# Patient Record
(demographics unavailable — no encounter records)

---

## 2025-01-01 NOTE — RESULTS/DATA
[FreeTextEntry1] : Accession:                             [id number]  Collected Date/Time:                   12/20/2024 15:31 EST Received Date/Time:                    12/21/2024 08:13 EST  Surgical Pathology Report - Auth (Verified)  Specimen(s) Submitted 1  Right breast 1:00 1 cmfn  Final Diagnosis Breast, right, 1:00 1 cmfn, core biopsy: - Benign dilated ducts and stromal fibrosis  Verified by: Tera Persaud M.D. (Electronic Signature) Reported on: 12/24/24 11:04 EST, [facility]-[address], [address] [place] [address] Phone: [phone]   Fax: [phone]

## 2025-01-01 NOTE — ASSESSMENT
[FreeTextEntry1] : 66F PMH breast cancer (2013) evaluated for breast cancer history + abnormal US findings.   #L Breast Cancer, Invasive Ductal, HER2+ (2013) - T2N1, Stage IIB - Pathology (2/7/2013): L Breast 2:00 infitrating ductal carcinoma, poorly differentiated, +lymphatic invasion; +L axillary LN positive for carcinoma - Treatment: Neoadjuvant TCH (Dr. Alfonso at [facility]) >> L mastectomy with ALND ([place]) >> herceptin (completed 2/2014) + anastrazole (10 years completed in 2/2024) - Mammogram 12/3/24 surveillance = BIRADS-4A, R inner breast with suspicious findings - CNB 12/20/24 R breast = negative for malignancy; +stromal fibrosis  Plan: - No need for any additional oncologic treatment given benign findings - Germline Genetic testing today ordered ([facility]) - will request MRI breast (Peer to Peer scheduled for later today) - C/w active surveillance, mammo/US in 6 months - BMD scan pending, ordered - PCP f/u for other routine healthcare maintenance  RTC in 6 months for follow up.  Discussed with Dr. Burhc. Rafael Ferraro MD Hematology & Oncology Fellow, PGY4

## 2025-01-01 NOTE — HISTORY OF PRESENT ILLNESS
[Disease: _____________________] : Disease: [unfilled] [T: ___] : T[unfilled] [N: ___] : N[unfilled] [AJCC Stage: ____] : AJCC Stage: [unfilled] [de-identified] : 66F PMH breast cancer () presents to establish medical oncology follow up after recent abnormal surveillance mammogram showed BIRADS-4A on contralateral breast, subsequent biopsy (24) was negative for malignancy. Pt denies any other complaints / issues at all today.   #L Breast Cancer, Invasive Ductal, HER2+ () - T2N1, Stage IIB - Pathology (2013): L Breast 2:00 infitrating ductal carcinoma, poorly differentiated, +lymphatic invasion; +L axillary LN positive for carcinoma - Treatment: Neoadjuvant TCH (Dr. Alfonso at [facility]) >> L mastectomy with ALND ([place]) >> herceptin (completed 2014) + anastrazole (10 years completed in 2024) - Mammogram 12/3/24 surveillance = BIRADS-4A, R inner breast with suspicious findings - CNB 24 R breast = negative for malignancy; +stromal fibrosis    PMH: breast cancer, HLD, hyperthyroid, HTN PSH: , L breast mastectomy () Meds: amlodipine, losartan, methimazole Allergies: NKDA FH: sister with breast cancer (age 68 on dx, still alive) SH: no EtOH/smoking; lives at home with , works as caregiver at a ; Diet healthy, homecooking; exercises at home cycling, walks often  Screenings: - Colonoscopy completed outside [facility], wnl per patient, <10 years ago - Pap Smear outside [facility], wnl per patient - Mammogram  - BMD 2022 last, osteopenia  GYN Hx Menstrual Hx: Menarche at age 13, Menopause around age 50 (~) Prior pregnancies:  Age at live birth: 1st birth at age 28 Fertility or Hormone Replacement Treatments: None Birth Control Pill Use: None Breast Feeding History: Breast fed all 3 kids [de-identified] : 2/6/2013 (US Core Needle L Breast 2:00, Axillary LN): 1 LN +carcinoma, L breast 1.4cm infiltrating ductal carcinoma, poorly differentiated, +lymphatic invasion [de-identified] : 1/24/2013 (US Core Needle): Invasive ductal carcinoma, poorly differentiated, L Breast 11-12 o'clock measuring 1.2cm ER 0%, NV 0%, HER-2 Positive Ki-67 60%  2/6/2013 (US Core Needle L Breast 2:00, Axillary LN): 1 LN +carcinoma, L breast 1.4cm infiltrating ductal carcionma, poorly differentiated, +lymphatic invasion ER-, NV+, Her2 Positive Ki-67 30%  7/02/2013 (L Mastectomy with axillary LN dissection): negative margins, 0/21 LNs positive for malignancy

## 2025-01-01 NOTE — RESULTS/DATA
[FreeTextEntry1] : Accession:                             [id number]  Collected Date/Time:                   12/20/2024 15:31 EST Received Date/Time:                    12/21/2024 08:13 EST  Surgical Pathology Report - Auth (Verified)  Specimen(s) Submitted 1  Right breast 1:00 1 cmfn  Final Diagnosis Breast, right, 1:00 1 cmfn, core biopsy: - Benign dilated ducts and stromal fibrosis  Verified by: Tera Persaud M.D. (Electronic Signature) Reported on: 12/24/24 11:04 EST, [facility]-[address], [address] [place] [address] Phone: [phone]   Fax: [phone]

## 2025-01-01 NOTE — HISTORY OF PRESENT ILLNESS
[Disease: _____________________] : Disease: [unfilled] [T: ___] : T[unfilled] [N: ___] : N[unfilled] [AJCC Stage: ____] : AJCC Stage: [unfilled] [de-identified] : 66F PMH breast cancer () presents to establish medical oncology follow up after recent abnormal surveillance mammogram showed BIRADS-4A on contralateral breast, subsequent biopsy (24) was negative for malignancy. Pt denies any other complaints / issues at all today.   #L Breast Cancer, Invasive Ductal, HER2+ () - T2N1, Stage IIB - Pathology (2013): L Breast 2:00 infitrating ductal carcinoma, poorly differentiated, +lymphatic invasion; +L axillary LN positive for carcinoma - Treatment: Neoadjuvant TCH (Dr. Alfonso at [facility]) >> L mastectomy with ALND ([place]) >> herceptin (completed 2014) + anastrazole (10 years completed in 2024) - Mammogram 12/3/24 surveillance = BIRADS-4A, R inner breast with suspicious findings - CNB 24 R breast = negative for malignancy; +stromal fibrosis    PMH: breast cancer, HLD, hyperthyroid, HTN PSH: , L breast mastectomy () Meds: amlodipine, losartan, methimazole Allergies: NKDA FH: sister with breast cancer (age 68 on dx, still alive) SH: no EtOH/smoking; lives at home with , works as caregiver at a ; Diet healthy, homecooking; exercises at home cycling, walks often  Screenings: - Colonoscopy completed outside [facility], wnl per patient, <10 years ago - Pap Smear outside [facility], wnl per patient - Mammogram  - BMD 2022 last, osteopenia  GYN Hx Menstrual Hx: Menarche at age 13, Menopause around age 50 (~) Prior pregnancies:  Age at live birth: 1st birth at age 28 Fertility or Hormone Replacement Treatments: None Birth Control Pill Use: None Breast Feeding History: Breast fed all 3 kids [de-identified] : 2/6/2013 (US Core Needle L Breast 2:00, Axillary LN): 1 LN +carcinoma, L breast 1.4cm infiltrating ductal carcinoma, poorly differentiated, +lymphatic invasion [de-identified] : 1/24/2013 (US Core Needle): Invasive ductal carcinoma, poorly differentiated, L Breast 11-12 o'clock measuring 1.2cm ER 0%, NV 0%, HER-2 Positive Ki-67 60%  2/6/2013 (US Core Needle L Breast 2:00, Axillary LN): 1 LN +carcinoma, L breast 1.4cm infiltrating ductal carcionma, poorly differentiated, +lymphatic invasion ER-, NV+, Her2 Positive Ki-67 30%  7/02/2013 (L Mastectomy with axillary LN dissection): negative margins, 0/21 LNs positive for malignancy

## 2025-01-01 NOTE — ASSESSMENT
[FreeTextEntry1] : 66F PMH breast cancer (2013) evaluated for breast cancer history + abnormal US findings.   #L Breast Cancer, Invasive Ductal, HER2+ (2013) - T2N1, Stage IIB - Pathology (2/7/2013): L Breast 2:00 infitrating ductal carcinoma, poorly differentiated, +lymphatic invasion; +L axillary LN positive for carcinoma - Treatment: Neoadjuvant TCH (Dr. Alfonso at [facility]) >> L mastectomy with ALND ([place]) >> herceptin (completed 2/2014) + anastrazole (10 years completed in 2/2024) - Mammogram 12/3/24 surveillance = BIRADS-4A, R inner breast with suspicious findings - CNB 12/20/24 R breast = negative for malignancy; +stromal fibrosis  Plan: - No need for any additional oncologic treatment given benign findings - Germline Genetic testing today ordered ([facility]) - will request MRI breast (Peer to Peer scheduled for later today) - C/w active surveillance, mammo/US in 6 months - BMD scan pending, ordered - PCP f/u for other routine healthcare maintenance  RTC in 6 months for follow up.  Discussed with Dr. Burch. Rafael Ferraro MD Hematology & Oncology Fellow, PGY4

## 2025-01-01 NOTE — REASON FOR VISIT
[Follow-Up Visit] : a follow-up [FreeTextEntry2] : Transferring care from Dr. Goldberg to Dr. Burch today

## 2025-06-19 NOTE — HISTORY OF PRESENT ILLNESS
[Disease: _____________________] : Disease: [unfilled] [T: ___] : T[unfilled] [N: ___] : N[unfilled] [AJCC Stage: ____] : AJCC Stage: [unfilled] [100: Normal, no complaints, no evidence of disease.] : 100: Normal, no complaints, no evidence of disease. [ECOG Performance Status: 0 - Fully active, able to carry on all pre-disease performance without restriction] : Performance Status: 0 - Fully active, able to carry on all pre-disease performance without restriction [de-identified] : 67F PMH breast cancer () presents to establish medical oncology follow up after abnormal surveillance mammogram in 2024 showed BIRADS-4A on contralateral breast, subsequent biopsy (24) was negative for malignancy.  #L Breast Cancer, Invasive Ductal, HER2+ () - T2N1, Stage IIB - Pathology (2013): L Breast 2:00 infitrating ductal carcinoma, poorly differentiated, +lymphatic invasion; +L axillary LN positive for carcinoma  She was treated with Neoadjuvant TCH (Dr. Alfonso at [facility]) >> L mastectomy with ALND ([place]) >> herceptin (completed 2014) + anastrazole (10 years completed in 2024) - Mammogram 12/3/24 surveillance = BIRADS-4A, R inner breast with suspicious findings - Core needle biopsy 24 R breast = negative for malignancy; +stromal fibrosis  PMH: breast cancer, HLD, hyperthyroid, HTN PSH: , L breast mastectomy () Meds: amlodipine, losartan, methimazole Allergies: NKDA FH: sister with breast cancer (age 68 on dx, still alive) SH: no EtOH/smoking; lives at home with , works as caregiver at a ; Diet healthy, homecooking; exercises at home cycling, walks often  GYN Hx Menstrual Hx: Menarche at age 13, Menopause around age 50 (~) Prior pregnancies:  Age at live birth: 1st birth at age 28 Fertility or Hormone Replacement Treatments: None Birth Control Pill Use: None Breast Feeding History: Breast fed all 3 kids [de-identified] : 2/6/2013 (US Core Needle L Breast 2:00, Axillary LN): 1 LN +carcinoma, L breast 1.4cm infiltrating ductal carcinoma, poorly differentiated, +lymphatic invasion [de-identified] : 1/24/2013 (US Core Needle): Invasive ductal carcinoma, poorly differentiated, L Breast 11-12 o'clock measuring 1.2cm ER 0%, [place] 0%, HER-2 Positive Ki-67 60%  2/6/2013 (US Core Needle L Breast 2:00, Axillary LN): 1 LN +carcinoma, L breast 1.4cm infiltrating ductal carcionma, poorly differentiated, +lymphatic invasion ER-, VT+, Her2 Positive Ki-67 30%  7/02/2013 (L Mastectomy with axillary LN dissection): negative margins, 0/21 LNs positive for malignancy [de-identified] : 6/19/25 Patient returns today to rule out progression or recurrence of breast cancer and to assess treatment toxicity. feels overall well; denies any new symptoms; reports no new breast findings  Screenings: - Colonoscopy completed outside [facility], wnl per patient, <10 years ago - Pap Smear outside [facility], wnl per patient - Mammogram right breast - 12/3/24 BIRADS 4A - biopsy negative - BMD 11/2022 last, osteopenia; repeat overdue

## 2025-06-19 NOTE — RESULTS/DATA
[FreeTextEntry1] : Accession:                             [id number]  Collected Date/Time:                   12/20/2024 15:31 EST Received Date/Time:                    12/21/2024 08:13 EST  Surgical Pathology Report - Auth (Verified)  Specimen(s) Submitted 1  Right breast 1:00 1 cmfn  Final Diagnosis Breast, right, 1:00 1 cmfn, core biopsy: - Benign dilated ducts and stromal fibrosis  Verified by: Tera Persaud M.D. (Electronic Signature) Reported on: 12/24/24 11:04 EST, [facility]-[address], [address] [place] [address] Phone: [phone]   Fax: [phone]

## 2025-06-19 NOTE — PHYSICAL EXAM
[Fully active, able to carry on all pre-disease performance without restriction] : Status 0 - Fully active, able to carry on all pre-disease performance without restriction [Normal] : affect appropriate [de-identified] : left breast with implant in place -no chest wall changes; right breast with no palpable lesions

## 2025-06-19 NOTE — ASSESSMENT
[FreeTextEntry1] : 67 F PMH Left breast cancer (T2N1, ER+, HER2+) diagnosed in 2013, treated with neoadjuvant TCH at [facility], followed by left mastectomy, adjuvant trastuzumab completed in 2/2014 and adjuvant endocrine therapy with anastrozole X10 years completed in 2/2024   - Mammogram 12/3/24 surveillance = BIRADS-4A, R inner breast with suspicious findings - CNB 12/20/24 R breast = negative for malignancy; +stromal fibrosis  Plan: - No need for any additional oncologic treatment given benign findings - Germline Genetic testing ordered in 12/2024 but not done as the kit was misplaced; will re-order at next visit - Breast MRI 12/27/24 - enhancement in right breast c/w recent biopsy; left breast implant intact - BIRADS -3 recommend mammo/sono in 6 months (6/2025) - ordered and pending; scheduled for 7/12/25 - BMD ordered in 12/2024 but not done - encouraged patient to call and schedule for July when she comes in for mammo/sono - PCP f/u for other routine healthcare maintenance  Patient had the opportunity to have all their questions answered to their satisfaction RTC in 6 months for follow up.